# Patient Record
Sex: FEMALE | Race: WHITE | HISPANIC OR LATINO | Employment: UNEMPLOYED | URBAN - METROPOLITAN AREA
[De-identification: names, ages, dates, MRNs, and addresses within clinical notes are randomized per-mention and may not be internally consistent; named-entity substitution may affect disease eponyms.]

---

## 2017-01-30 ENCOUNTER — OFFICE VISIT (OUTPATIENT)
Dept: MATERNAL FETAL MEDICINE | Facility: CLINIC | Age: 27
End: 2017-01-30
Payer: OTHER GOVERNMENT

## 2017-01-30 VITALS
DIASTOLIC BLOOD PRESSURE: 81 MMHG | WEIGHT: 103.31 LBS | BODY MASS INDEX: 20.28 KG/M2 | SYSTOLIC BLOOD PRESSURE: 98 MMHG | HEIGHT: 60 IN

## 2017-01-30 DIAGNOSIS — O44.20 MARGINAL PLACENTA PREVIA: ICD-10-CM

## 2017-01-30 DIAGNOSIS — Z82.79 FAMILY HISTORY OF CLEFT PALATE: ICD-10-CM

## 2017-01-30 DIAGNOSIS — Z82.79 FAMILY HISTORY OF CLEFT PALATE WITH CLEFT LIP: ICD-10-CM

## 2017-01-30 PROCEDURE — 99204 OFFICE O/P NEW MOD 45 MIN: CPT | Mod: 25,S$GLB,, | Performed by: OBSTETRICS & GYNECOLOGY

## 2017-01-30 PROCEDURE — 76811 OB US DETAILED SNGL FETUS: CPT | Mod: S$GLB,,, | Performed by: OBSTETRICS & GYNECOLOGY

## 2017-01-30 PROCEDURE — 76817 TRANSVAGINAL US OBSTETRIC: CPT | Mod: S$GLB,,, | Performed by: OBSTETRICS & GYNECOLOGY

## 2017-01-30 NOTE — PROGRESS NOTES
Chief complaint: Family History isolated cleft palate    Provider requesting consultation: Dr. Hooker    26 y.o. [id number] 19w2d EGA      PMH:  Past Medical History   Diagnosis Date    Abnormal Pap smear of cervix      +HPV    Hypothyroidism        PObHx:  OB History    Para Term  AB SAB TAB Ectopic Multiple Living   3 2 2 0 0 0 0 0 0 2      # Outcome Date GA Lbr [place]/2nd Weight Sex Delivery Anes PTL Lv   3 Current            2 Term 2015 39w5d  3.033 kg (6 lb 11 oz) M Vag-Spont   Y   1 Term 2011 39w6d  3.005 kg (6 lb 10 oz) F Vag-Spont   Y          PSH:History reviewed. No pertinent past surgical history.    Family history:family history includes Cleft palate in her brother.    Social history: reports that she has never smoked. She does not have any smokeless tobacco history on file. She reports that she does not drink alcohol or use illicit drugs.    A detailed fetal anatomical ultrasound was completed today.  See details in imaging section of [facility].      The most common craniofacial malformation identified in the  is the orofacial cleft, which consists of cleft lip with or without cleft palate (CL/P) or isolated cleft palate (CP). They can occur as part of a syndrome involving multiple other organs or as an isolated malformation. Most studies suggest that about 70 percent of cases of CL/P and 50 percent of CP are nonsyndromic. Although both congenital anomalies result in malformation of the midface, CL/P and CP differ with respect to embryology, etiology, candidate genes, associated abnormalities, and recurrence risk.        Fetuses found to have orofacial clefts should undergo careful assessment for additional structural abnormalities, as these defects were noted in  22 percent of newborns with  CP, 28 percent of those with CL and CP, and 8 percent for those with  CL in one study. The variation in rate of associated anomalies emphasizes the underlying differences in the  "development of CL/P versus CP. The location of the cleft also appears to affect the incidence of anomalies: in another study, the incidence of anomalies was 9.8 percent for unilateral CL/P, 25 percent for bilateral CL/P, and 100 percent for midline CL/P. These anomalies typically involve the central nervous system/skeletal system (33 percent) and cardiovascular system (24 percent), all sites of tissues with neural ectodermal origin.    Among fetuses with a presumed isolated CL/P visualized by ultrasound, even careful surveillance for additional anomalies may not be revealing. As an example, in one series of fetuses presumed to have "isolated" CL/P after detailed ultrasound, nearly a quarter (21.6 percent) were found to have additional anomalies at birth.    Chromosomal abnormalities are rare in fetuses with isolated clefts, but are found in 40 to 60 percent of those with both orofacial clefts and other anomalies.       Amniocentesis for karyotype should be offered to women with ultrasound findings of fetal orofacial clefts and associated anomalies because of the high rate of chromosomal defects. Orofacial clefts in the absence of associated congenital abnormalities are unlikely to be associated with a chromosomal abnormality; however, the difficulty in prenatal sonographic diagnosis of some of the associated malformations supports the offer of chromosomal evaluation in all fetuses with prenatally diagnosed facial clefts.  In a case of a family history of isolated cleft as in this case the risk of associated anomalies is low.       Frequency of oral clefts in relatives based on the proband's phenotype  Relative  CL (percent)  CL/P (percent)  CP (percent)    Sibling 2.5 3.9 3.3   Half sibling 1.0 0.5 1.0   Parent 2.5 2.5 2.1   Offspring 3.5 4.1 4.2   Niece/nephew 0.9 0.8 1.1   Aunt/uncle 0.6 1.1 0.6   First cousin 0.3 0.5 0.4     Today's US reveals an intact lip and palate.      On today's US a marginal previa was " identified.   I discussed with patient the bleeding risks associated with placenta previa.  I also reviewed the normal growth of the uterus and the high likelihood that the placental edge will migrate away from the cervical os.  After today's visit I would like to reinspect placental location at 32-36 weeks EGA to confirm it has moved from the cervical os.  Bleeding precautions and pelvic rest reviewed with patient today.     Results of today's ultrasound discussed with patient.  I spent 45 minutes with patient today over half of which was in consultation separate of her ultrasound examination.     Referring physician to receive copy of today's consultation via electronic medical record.

## 2017-04-25 DIAGNOSIS — O44.20 MARGINAL PLACENTA PREVIA: Primary | ICD-10-CM

## 2017-05-02 ENCOUNTER — OFFICE VISIT (OUTPATIENT)
Dept: MATERNAL FETAL MEDICINE | Facility: CLINIC | Age: 27
End: 2017-05-02
Payer: OTHER GOVERNMENT

## 2017-05-02 DIAGNOSIS — O44.20 MARGINAL PLACENTA PREVIA: ICD-10-CM

## 2017-05-02 PROCEDURE — 99499 UNLISTED E&M SERVICE: CPT | Mod: S$GLB,,, | Performed by: OBSTETRICS & GYNECOLOGY

## 2017-05-02 PROCEDURE — 76816 OB US FOLLOW-UP PER FETUS: CPT | Mod: S$GLB,,, | Performed by: OBSTETRICS & GYNECOLOGY

## 2017-05-02 PROCEDURE — 76819 FETAL BIOPHYS PROFIL W/O NST: CPT | Mod: S$GLB,,, | Performed by: OBSTETRICS & GYNECOLOGY

## 2017-05-02 PROCEDURE — 76817 TRANSVAGINAL US OBSTETRIC: CPT | Mod: S$GLB,,, | Performed by: OBSTETRICS & GYNECOLOGY
